# Patient Record
Sex: MALE | Race: OTHER | HISPANIC OR LATINO | URBAN - METROPOLITAN AREA
[De-identification: names, ages, dates, MRNs, and addresses within clinical notes are randomized per-mention and may not be internally consistent; named-entity substitution may affect disease eponyms.]

---

## 2018-03-04 ENCOUNTER — EMERGENCY (EMERGENCY)
Facility: HOSPITAL | Age: 38
LOS: 0 days | Discharge: HOME | End: 2018-03-04

## 2018-03-04 VITALS
OXYGEN SATURATION: 100 % | RESPIRATION RATE: 18 BRPM | HEART RATE: 68 BPM | DIASTOLIC BLOOD PRESSURE: 75 MMHG | SYSTOLIC BLOOD PRESSURE: 125 MMHG | TEMPERATURE: 98 F

## 2018-03-04 DIAGNOSIS — L30.9 DERMATITIS, UNSPECIFIED: ICD-10-CM

## 2018-03-04 DIAGNOSIS — R21 RASH AND OTHER NONSPECIFIC SKIN ERUPTION: ICD-10-CM

## 2018-03-04 RX ORDER — FAMOTIDINE 10 MG/ML
20 INJECTION INTRAVENOUS DAILY
Qty: 0 | Refills: 0 | Status: DISCONTINUED | OUTPATIENT
Start: 2018-03-04 | End: 2018-03-04

## 2018-03-04 RX ORDER — DIPHENHYDRAMINE HCL 50 MG
50 CAPSULE ORAL EVERY 6 HOURS
Qty: 0 | Refills: 0 | Status: DISCONTINUED | OUTPATIENT
Start: 2018-03-04 | End: 2018-03-04

## 2018-03-04 RX ADMIN — Medication 50 MILLIGRAM(S): at 19:11

## 2018-03-04 RX ADMIN — FAMOTIDINE 20 MILLIGRAM(S): 10 INJECTION INTRAVENOUS at 19:12

## 2018-03-04 RX ADMIN — Medication 60 MILLIGRAM(S): at 19:12

## 2018-03-04 NOTE — ED PROVIDER NOTE - PROGRESS NOTE DETAILS
D/W pt exact etiology of rash not known.  Discuss allergy vs infxn vs other inflam process.  D/W pt plan to tx and monitor closely.  All lesions blanching, pt looks well, agrees return to ER if worse.  Agrees f/u allergy/derm if persist  side effects of steroids discussed. risk for GI upset. PUD, gerd, gastrities, bleeding explained.  take steroid w food, may use otc prilosec if GI upset.  d/c use if intolerable s/e , ie pain, psychosis pt feeling better, wishes to go home, no issues with meds given

## 2018-03-04 NOTE — ED PROVIDER NOTE - CHPI ED SYMPTOMS NEG
no pain/no petechia/no decreased eating/drinking/no fever/no scaly patches on skin/no vomiting/no bruising/no inflammation/no chills

## 2018-03-04 NOTE — ED PROVIDER NOTE - OBJECTIVE STATEMENT
rash to face x 2 days with some improvement from OTC benadryl  pt admits to eating shrimp prior to onset, but has eaten before without any reaction  no sob, throat swelling or itching, rash elsewhere etc

## 2018-03-04 NOTE — ED ADULT NURSE NOTE - OBJECTIVE STATEMENT
Pt with rash to b/l cheeks and neck. Pt reports he ate fish 2 days ago but it wasn't his first time having fish. No difficulty breathing or swallowing.